# Patient Record
Sex: MALE | Race: ASIAN | NOT HISPANIC OR LATINO | URBAN - METROPOLITAN AREA
[De-identification: names, ages, dates, MRNs, and addresses within clinical notes are randomized per-mention and may not be internally consistent; named-entity substitution may affect disease eponyms.]

---

## 2018-02-20 ENCOUNTER — EMERGENCY (EMERGENCY)
Facility: HOSPITAL | Age: 33
LOS: 1 days | Discharge: HOME | End: 2018-02-20
Attending: EMERGENCY MEDICINE
Payer: SELF-PAY

## 2018-02-20 VITALS
TEMPERATURE: 98 F | DIASTOLIC BLOOD PRESSURE: 77 MMHG | OXYGEN SATURATION: 97 % | SYSTOLIC BLOOD PRESSURE: 125 MMHG | HEART RATE: 64 BPM | RESPIRATION RATE: 18 BRPM | WEIGHT: 229.94 LBS

## 2018-02-20 PROCEDURE — 99285 EMERGENCY DEPT VISIT HI MDM: CPT

## 2018-02-20 PROCEDURE — 99284 EMERGENCY DEPT VISIT MOD MDM: CPT | Mod: 25

## 2018-02-20 PROCEDURE — 73562 X-RAY EXAM OF KNEE 3: CPT | Mod: 26,RT

## 2018-02-20 PROCEDURE — 72040 X-RAY EXAM NECK SPINE 2-3 VW: CPT | Mod: 26

## 2018-02-20 PROCEDURE — 72040 X-RAY EXAM NECK SPINE 2-3 VW: CPT

## 2018-02-20 PROCEDURE — 73562 X-RAY EXAM OF KNEE 3: CPT

## 2018-02-20 RX ORDER — ACETAMINOPHEN 500 MG
650 TABLET ORAL ONCE
Qty: 0 | Refills: 0 | Status: COMPLETED | OUTPATIENT
Start: 2018-02-20 | End: 2018-02-20

## 2018-02-20 RX ADMIN — Medication 650 MILLIGRAM(S): at 12:06
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
## 2018-02-20 NOTE — ED PROVIDER NOTE - NEUROLOGICAL, MLM
Alert and oriented, no focal deficits, no motor or sensory deficits. Nml varus and valgus testing, lachman test negative.

## 2018-02-20 NOTE — ED PROVIDER NOTE - MUSCULOSKELETAL MINIMAL EXAM
mild midline TTP to upper c-spine, C2 and C7 area. No other spinal TTP in thoracic and lumbar areas. R knee able to bear weight, full ROM, no deformities, no swelling. Mild medial knee TTP.
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
## 2023-10-30 NOTE — ED ADULT NURSE NOTE - CAS EDN INTEG ASSESS
What Type Of Note Output Would You Prefer (Optional)?: Standard Output How Did Your Itching Occur?: sudden in onset (over a period of weeks to a few months) How Severe Is Your Itching?: mild WDL

## 2024-01-25 NOTE — ED ADULT NURSE NOTE - RESPIRATORY ASSESSMENT
Pt a/ox4. VSS. IV removed. Pt discharging home after he refused [facility] and HH. He has an order for outpatient PT- I gave him the number to call for [facility] outpatient [facility] to see if his insurance covers. I went through all his discharge instructions with him. I questioned the Eliquis instructions with Dr. Naye Joseph- but patient is refusing to take his eliquis until he sees his cardiologist on Monday.   SHASHANK Arellano wanted to set up a medicar for patient to ensure a safe delivery to his apartment. However, the patient adamantly refused any ride in a medicar or ambulance through [facility]. He did not want to wait to be picked up. I educated him multiple times on the importance of getting up his stairs safely, but he still refused a medicar and said he will take a bus. I finally got the patient to agree on a cab. I made him walk in the halls with me to ensure he was steady.   A cab was called for the patient and he was wheeled down to lobby with all his belongings.     WDL